# Patient Record
Sex: MALE | Race: WHITE | NOT HISPANIC OR LATINO | Employment: FULL TIME | URBAN - METROPOLITAN AREA
[De-identification: names, ages, dates, MRNs, and addresses within clinical notes are randomized per-mention and may not be internally consistent; named-entity substitution may affect disease eponyms.]

---

## 2018-09-19 ENCOUNTER — HOSPITAL ENCOUNTER (EMERGENCY)
Facility: HOSPITAL | Age: 53
Discharge: HOME | End: 2018-09-19
Attending: EMERGENCY MEDICINE | Admitting: EMERGENCY MEDICINE

## 2018-09-19 ENCOUNTER — APPOINTMENT (OUTPATIENT)
Dept: CARDIOLOGY | Facility: HOSPITAL | Age: 53
End: 2018-09-19
Attending: EMERGENCY MEDICINE

## 2018-09-19 VITALS
TEMPERATURE: 99.4 F | HEART RATE: 79 BPM | BODY MASS INDEX: 37.11 KG/M2 | RESPIRATION RATE: 18 BRPM | HEIGHT: 73 IN | WEIGHT: 280 LBS | DIASTOLIC BLOOD PRESSURE: 70 MMHG | OXYGEN SATURATION: 95 % | SYSTOLIC BLOOD PRESSURE: 119 MMHG

## 2018-09-19 DIAGNOSIS — M10.9 ACUTE GOUT OF RIGHT ANKLE, UNSPECIFIED CAUSE: Primary | ICD-10-CM

## 2018-09-19 LAB
BASOPHILS # BLD AUTO: 0.01 10*3/MM3 (ref 0–0.2)
BASOPHILS NFR BLD AUTO: 0.1 % (ref 0–1.5)
BH CV LOWER VASCULAR LEFT COMMON FEMORAL AUGMENT: NORMAL
BH CV LOWER VASCULAR LEFT COMMON FEMORAL COMPETENT: NORMAL
BH CV LOWER VASCULAR LEFT COMMON FEMORAL COMPRESS: NORMAL
BH CV LOWER VASCULAR LEFT COMMON FEMORAL PHASIC: NORMAL
BH CV LOWER VASCULAR LEFT COMMON FEMORAL SPONT: NORMAL
BH CV LOWER VASCULAR RIGHT COMMON FEMORAL AUGMENT: NORMAL
BH CV LOWER VASCULAR RIGHT COMMON FEMORAL COMPETENT: NORMAL
BH CV LOWER VASCULAR RIGHT COMMON FEMORAL COMPRESS: NORMAL
BH CV LOWER VASCULAR RIGHT COMMON FEMORAL PHASIC: NORMAL
BH CV LOWER VASCULAR RIGHT COMMON FEMORAL SPONT: NORMAL
BH CV LOWER VASCULAR RIGHT DISTAL FEMORAL COMPRESS: NORMAL
BH CV LOWER VASCULAR RIGHT GASTRONEMIUS COMPRESS: NORMAL
BH CV LOWER VASCULAR RIGHT GREATER SAPH AK COMPRESS: NORMAL
BH CV LOWER VASCULAR RIGHT GREATER SAPH BK COMPRESS: NORMAL
BH CV LOWER VASCULAR RIGHT LESSER SAPH COMPRESS: NORMAL
BH CV LOWER VASCULAR RIGHT MID FEMORAL AUGMENT: NORMAL
BH CV LOWER VASCULAR RIGHT MID FEMORAL COMPETENT: NORMAL
BH CV LOWER VASCULAR RIGHT MID FEMORAL COMPRESS: NORMAL
BH CV LOWER VASCULAR RIGHT MID FEMORAL PHASIC: NORMAL
BH CV LOWER VASCULAR RIGHT MID FEMORAL SPONT: NORMAL
BH CV LOWER VASCULAR RIGHT PERONEAL COMPRESS: NORMAL
BH CV LOWER VASCULAR RIGHT POPLITEAL AUGMENT: NORMAL
BH CV LOWER VASCULAR RIGHT POPLITEAL COMPETENT: NORMAL
BH CV LOWER VASCULAR RIGHT POPLITEAL COMPRESS: NORMAL
BH CV LOWER VASCULAR RIGHT POPLITEAL PHASIC: NORMAL
BH CV LOWER VASCULAR RIGHT POPLITEAL SPONT: NORMAL
BH CV LOWER VASCULAR RIGHT POSTERIOR TIBIAL COMPRESS: NORMAL
BH CV LOWER VASCULAR RIGHT PROXIMAL FEMORAL COMPRESS: NORMAL
BH CV LOWER VASCULAR RIGHT SAPHENOFEMORAL JUNCTION AUGMENT: NORMAL
BH CV LOWER VASCULAR RIGHT SAPHENOFEMORAL JUNCTION COMPETENT: NORMAL
BH CV LOWER VASCULAR RIGHT SAPHENOFEMORAL JUNCTION COMPRESS: NORMAL
BH CV LOWER VASCULAR RIGHT SAPHENOFEMORAL JUNCTION PHASIC: NORMAL
BH CV LOWER VASCULAR RIGHT SAPHENOFEMORAL JUNCTION SPONT: NORMAL
D DIMER PPP FEU-MCNC: 0.96 MCGFEU/ML (ref 0–0.49)
DEPRECATED RDW RBC AUTO: 46.3 FL (ref 37–54)
EOSINOPHIL # BLD AUTO: 0.06 10*3/MM3 (ref 0–0.7)
EOSINOPHIL NFR BLD AUTO: 0.9 % (ref 0.3–6.2)
ERYTHROCYTE [DISTWIDTH] IN BLOOD BY AUTOMATED COUNT: 13.5 % (ref 11.5–14.5)
HCT VFR BLD AUTO: 46.4 % (ref 40.4–52.2)
HGB BLD-MCNC: 15.3 G/DL (ref 13.7–17.6)
IMM GRANULOCYTES # BLD: 0 10*3/MM3 (ref 0–0.03)
IMM GRANULOCYTES NFR BLD: 0 % (ref 0–0.5)
INR PPP: 1.05 (ref 0.9–1.1)
LYMPHOCYTES # BLD AUTO: 1.82 10*3/MM3 (ref 0.9–4.8)
LYMPHOCYTES NFR BLD AUTO: 26.5 % (ref 19.6–45.3)
MCH RBC QN AUTO: 31.2 PG (ref 27–32.7)
MCHC RBC AUTO-ENTMCNC: 33 G/DL (ref 32.6–36.4)
MCV RBC AUTO: 94.5 FL (ref 79.8–96.2)
MONOCYTES # BLD AUTO: 1.06 10*3/MM3 (ref 0.2–1.2)
MONOCYTES NFR BLD AUTO: 15.5 % (ref 5–12)
NEUTROPHILS # BLD AUTO: 3.91 10*3/MM3 (ref 1.9–8.1)
NEUTROPHILS NFR BLD AUTO: 57 % (ref 42.7–76)
PLATELET # BLD AUTO: 210 10*3/MM3 (ref 140–500)
PMV BLD AUTO: 10 FL (ref 6–12)
PROTHROMBIN TIME: 13.5 SECONDS (ref 11.7–14.2)
RBC # BLD AUTO: 4.91 10*6/MM3 (ref 4.6–6)
URATE SERPL-MCNC: 8.3 MG/DL (ref 3.4–7)
WBC NRBC COR # BLD: 6.86 10*3/MM3 (ref 4.5–10.7)

## 2018-09-19 PROCEDURE — 93971 EXTREMITY STUDY: CPT

## 2018-09-19 PROCEDURE — 85025 COMPLETE CBC W/AUTO DIFF WBC: CPT | Performed by: EMERGENCY MEDICINE

## 2018-09-19 PROCEDURE — 85379 FIBRIN DEGRADATION QUANT: CPT | Performed by: EMERGENCY MEDICINE

## 2018-09-19 PROCEDURE — 85610 PROTHROMBIN TIME: CPT | Performed by: EMERGENCY MEDICINE

## 2018-09-19 PROCEDURE — 99283 EMERGENCY DEPT VISIT LOW MDM: CPT

## 2018-09-19 PROCEDURE — 84550 ASSAY OF BLOOD/URIC ACID: CPT | Performed by: EMERGENCY MEDICINE

## 2018-09-19 RX ORDER — PROPRANOLOL HYDROCHLORIDE 10 MG/1
10 TABLET ORAL 3 TIMES DAILY
COMMUNITY

## 2018-09-19 RX ORDER — PREDNISONE 10 MG/1
TABLET ORAL
Qty: 52 TABLET | Refills: 0 | Status: SHIPPED | OUTPATIENT
Start: 2018-09-19

## 2018-09-19 RX ORDER — COLCHICINE 0.6 MG/1
0.6 TABLET ORAL 3 TIMES DAILY
Qty: 3 TABLET | Refills: 0 | Status: SHIPPED | OUTPATIENT
Start: 2018-09-19 | End: 2018-09-20

## 2018-09-19 NOTE — ED PROVIDER NOTES
EMERGENCY DEPARTMENT ENCOUNTER    Room number:  12/12  Date Seen:  9/19/2018  Time of transfer: 0700  PCP:  Manasa To MD    Laboratory Results:  Recent Results (from the past 24 hour(s))   Protime-INR    Collection Time: 09/19/18  6:02 AM   Result Value Ref Range    Protime 13.5 11.7 - 14.2 Seconds    INR 1.05 0.90 - 1.10   D-dimer, Quantitative    Collection Time: 09/19/18  6:02 AM   Result Value Ref Range    D-Dimer, Quantitative 0.96 (H) 0.00 - 0.49 MCGFEU/mL   Uric Acid    Collection Time: 09/19/18  6:02 AM   Result Value Ref Range    Uric Acid 8.3 (H) 3.4 - 7.0 mg/dL   CBC Auto Differential    Collection Time: 09/19/18  6:02 AM   Result Value Ref Range    WBC 6.86 4.50 - 10.70 10*3/mm3    RBC 4.91 4.60 - 6.00 10*6/mm3    Hemoglobin 15.3 13.7 - 17.6 g/dL    Hematocrit 46.4 40.4 - 52.2 %    MCV 94.5 79.8 - 96.2 fL    MCH 31.2 27.0 - 32.7 pg    MCHC 33.0 32.6 - 36.4 g/dL    RDW 13.5 11.5 - 14.5 %    RDW-SD 46.3 37.0 - 54.0 fl    MPV 10.0 6.0 - 12.0 fL    Platelets 210 140 - 500 10*3/mm3    Neutrophil % 57.0 42.7 - 76.0 %    Lymphocyte % 26.5 19.6 - 45.3 %    Monocyte % 15.5 (H) 5.0 - 12.0 %    Eosinophil % 0.9 0.3 - 6.2 %    Basophil % 0.1 0.0 - 1.5 %    Immature Grans % 0.0 0.0 - 0.5 %    Neutrophils, Absolute 3.91 1.90 - 8.10 10*3/mm3    Lymphocytes, Absolute 1.82 0.90 - 4.80 10*3/mm3    Monocytes, Absolute 1.06 0.20 - 1.20 10*3/mm3    Eosinophils, Absolute 0.06 0.00 - 0.70 10*3/mm3    Basophils, Absolute 0.01 0.00 - 0.20 10*3/mm3    Immature Grans, Absolute 0.00 0.00 - 0.03 10*3/mm3   Duplex Venous Lower Extremity - Right    Collection Time: 09/19/18  8:22 AM   Result Value Ref Range    Right Common Femoral Spont Y     Right Common Femoral Phasic Y     Right Common Femoral Augment Y     Right Common Femoral Competent Y     Right Common Femoral Compress C     Right Saphenofemoral Junction Spont Y     Right Saphenofemoral Junction Phasic Y     Right Saphenofemoral Junction Augment Y     Right  Saphenofemoral Junction Competent Y     Right Saphenofemoral Junction Compress C     Right Proximal Femoral Compress C     Right Mid Femoral Spont Y     Right Mid Femoral Phasic Y     Right Mid Femoral Augment Y     Right Mid Femoral Competent Y     Right Mid Femoral Compress C     Right Distal Femoral Compress C     Right Popliteal Spont Y     Right Popliteal Phasic Y     Right Popliteal Augment Y     Right Popliteal Competent Y     Right Popliteal Compress C     Right Posterior Tibial Compress C     Right Peroneal Compress C     Right GastronemiusSoleal Compress C     Right Greater Saph AK Compress C     Right Greater Saph BK Compress C     Right Lesser Saph Compress C     Left Common Femoral Spont Y     Left Common Femoral Phasic Y     Left Common Femoral Augment Y     Left Common Femoral Competent Y     Left Common Femoral Compress C      I reviewed the above results.      Medications ordered in ED:  Medications - No data to display    Progress and Consult Notes:  0700    Patient care transferred from Dr. Loyd pending US results.    0834  Pt presents with right ankle pain that began several days ago while Pt was sleeping. Pt states that his pain is worse with flexion. He has taken advil for pain with no relief. He denies any trauma to the area.   Informed Pt that his US is negative and that his uric acid is elevated. This indicates that it is most likely gout.       GENERAL: not distressed  HENT: nares patent  EYES: no scleral icterus  CV: regular rhythm, regular rate, 2+ DP and PT pulses  RESPIRATORY: normal effort, bibasilar crackles with rhonchi on the left  ABDOMEN: soft, non-tender  MUSCULOSKELETAL: no deformity, pain with passive ROM of ankle on dorsiflexion but essentially no pain with plantarflexion and inversion/eversion , right ankle is warm.  NEURO: alert, CLEMENTE, FC  SKIN: warm, dry    Discussed plan to discharge Pt with steroids and colchicine to treat (patient would like both as he has used both  previously). Pt understands and agrees to all plans. All questions answered. I gave him good return precautions including return for any chest pain, SOA, fever, increased pain or leg swelling, or new rash.       Diagnosis:  Final diagnoses:   Acute gout of right ankle, unspecified cause       Follow Up:  Manasa To MD  [address] [place] [address]  [address]  [place] [address]  [phone]    Schedule an appointment as soon as possible for a visit   As needed      RX:     Medication List      New Prescriptions    colchicine 0.6 MG tablet  Take 1 tablet by mouth 3 (Three) Times a Day for 1 day.     predniSONE 10 MG tablet  Commonly known as:  DELTASONE  Take 4 tablets daily for 7 days. Then take 3 tablets daily for 2 days.   Take 2 tablets daily for 2 days. Take 1 tablet daily for 2 days.          Provider attestation:  I personally reviewed the past medical history, past surgical history, social history, family history, current medications, and allergies as they appear in the chart.    The patient was seen and examined by myself and Dr. Loyd, who agree with plan.          Vida Abreu  09/19/18 0949       Raji Valencia II, MD  09/20/18 0260

## 2018-09-19 NOTE — ED PROVIDER NOTES
EMERGENCY DEPARTMENT ENCOUNTER    CHIEF COMPLAINT  Chief Complaint: Ankle pain  History given by: patient   History limited by: n/a  Room Number: 12/12  PMD: Manasa To MD      HPI:  Pt is a 52 y.o. male who presents complaining of right ankle pain that began began suddenly 2 nights ago while sleeping. The pain is worsened by movement and touch. Pt denies a known injury, CP, or SOA. He has no other complaints at this time.     Duration:  2 days   Onset: sudden  Timing: constant   Location: right ankle  Radiation: none  Quality: pain  Intensity/Severity: moderate  Progression: worsening   Associated Symptoms: none  Aggravating Factors: movement   Alleviating Factors: none    PAST MEDICAL HISTORY  Active Ambulatory Problems     Diagnosis Date Noted   • No Active Ambulatory Problems     Resolved Ambulatory Problems     Diagnosis Date Noted   • No Resolved Ambulatory Problems     Past Medical History:   Diagnosis Date   • Anxiety    • Hypertension        PAST SURGICAL HISTORY  Past Surgical History:   Procedure Laterality Date   • CARDIAC CATHETERIZATION     • VARICOSE VEIN SURGERY         FAMILY HISTORY  History reviewed. No pertinent family history.    SOCIAL HISTORY  Social History     Social History   • Marital status:      Spouse name: N/A   • Number of children: N/A   • Years of education: N/A     Occupational History   • Not on file.     Social History Main Topics   • Smoking status: Never Smoker   • Smokeless tobacco: Never Used   • Alcohol use Yes      Comment: occas   • Drug use: No   • Sexual activity: Not on file     Other Topics Concern   • Not on file     Social History Narrative   • No narrative on file       ALLERGIES  Patient has no known allergies.    REVIEW OF SYSTEMS  Review of Systems   Constitutional: Negative for chills and fever.   HENT: Negative for sore throat.    Gastrointestinal: Negative for nausea and vomiting.   Genitourinary: Negative for dysuria.   Musculoskeletal:  Positive for arthralgias (right ankle). Negative for back pain.   Skin: Negative for rash.   Psychiatric/Behavioral: The patient is not nervous/anxious.        PHYSICAL EXAM  ED Triage Vitals [09/19/18 0520]   Temp Heart Rate Resp BP SpO2   99.4 °F (37.4 °C) 98 16 -- 95 %      Temp src Heart Rate Source Patient Position BP Location FiO2 (%)   -- Monitor -- -- --       Physical Exam   Constitutional: No distress.   HENT:   Head: Normocephalic and atraumatic.   Eyes: EOM are normal.   Neck: Normal range of motion.   Cardiovascular: Normal rate, regular rhythm and normal heart sounds.    Pulmonary/Chest: Effort normal and breath sounds normal. No respiratory distress.   Abdominal: Soft. There is no tenderness.   Musculoskeletal: He exhibits no edema.   Tenderness behind the left lateral malleolus and across the dorsum of the left foot. Increased pain with all ROM against resistance. No swelling present. Mild left calf tenderness.    Neurological: He is alert.   Skin: Skin is warm and dry.   Nursing note and vitals reviewed.      LAB RESULTS  Lab Results (last 24 hours)     Procedure Component Value Units Date/Time    CBC & Differential [[id number]] Collected:  09/19/18 0602    Specimen:  Blood Updated:  09/19/18 0635    Narrative:       The following orders were created for panel order CBC & Differential.  Procedure                               Abnormality         Status                     ---------                               -----------         ------                     CBC Auto Differential[[id number]]         Abnormal            Final result                 Please view results for these tests on the individual orders.    Protime-INR [[id number]]  (Normal) Collected:  09/19/18 0602    Specimen:  Blood Updated:  09/19/18 0631     Protime 13.5 Seconds      INR 1.05    D-dimer, Quantitative [[id number]]  (Abnormal) Collected:  09/19/18 0602    Specimen:  Blood Updated:  09/19/18 0631     D-Dimer, Quantitative 0.96 (H)  MCGFEU/mL     Narrative:       The [employer]ago D-Dimer test used in conjunction with a clinical pretest probability (PTP) assessment model, has been approved by the FDA to rule out the presence of venous thromboembolism (VTE) in outpatients suspected of deep venous thrombosis (DVT) or pulmonary embolism (PE).     Uric Acid [[id number]]  (Abnormal) Collected:  09/19/18 0602    Specimen:  Blood Updated:  09/19/18 0637     Uric Acid 8.3 (H) mg/dL     CBC Auto Differential [[id number]]  (Abnormal) Collected:  09/19/18 0602    Specimen:  Blood Updated:  09/19/18 0635     WBC 6.86 10*3/mm3      RBC 4.91 10*6/mm3      Hemoglobin 15.3 g/dL      Hematocrit 46.4 %      MCV 94.5 fL      MCH 31.2 pg      MCHC 33.0 g/dL      RDW 13.5 %      RDW-SD 46.3 fl      MPV 10.0 fL      Platelets 210 10*3/mm3      Neutrophil % 57.0 %      Lymphocyte % 26.5 %      Monocyte % 15.5 (H) %      Eosinophil % 0.9 %      Basophil % 0.1 %      Immature Grans % 0.0 %      Neutrophils, Absolute 3.91 10*3/mm3      Lymphocytes, Absolute 1.82 10*3/mm3      Monocytes, Absolute 1.06 10*3/mm3      Eosinophils, Absolute 0.06 10*3/mm3      Basophils, Absolute 0.01 10*3/mm3      Immature Grans, Absolute 0.00 10*3/mm3           I ordered the above labs and reviewed the results      PROCEDURES  Procedures       PROGRESS AND CONSULTS       05:40  BP- 152/93 HR- 98 Temp- 99.4 °F (37.4 °C) O2 sat- 95%  Informed pt of the plan for labs to r/o gout and DVT. As there is no injury, XR is not indicated. Pt understands and agrees with the plan, all questions answered.    05:42  PT/INR, d-dimer, uric acid, and CBC ordered.     06:37  D-dimer is elevated. Duplex doppler of the RLE ordered.     06:38  BP- 152/93 HR- 98 Temp- 99.4 °F (37.4 °C) O2 sat- 95%  Rechecked the patient who is in NAD and is resting comfortably. Informed pt that the d-dimer is slightly elevated, so plan for doppler to r/o DVT. Uric acid is also elevated, so sx may represent gout if doppler is negative. Pt  understands and agrees with the plan, all questions answered.    07:00  Cared turned over to Dr Valencia pending duplex venous doppler results and final disposition.     MEDICAL DECISION MAKING  Results were reviewed/discussed with the patient and they were also made aware of online access. Pt also made aware that some labs, such as cultures, will not be resulted during ER visit and follow up with PMD is necessary.     MDM  Number of Diagnoses or Management Options     Amount and/or Complexity of Data Reviewed  Clinical lab tests: ordered and reviewed           DIAGNOSIS  Final diagnoses:   None       DISPOSITION  Care turned over     Latest Documented Vital Signs:  As of 6:40 AM  BP- 152/93 HR- 98 Temp- 99.4 °F (37.4 °C) O2 sat- 95%    --  Documentation assistance provided by ervin Arboleda for Dr Loyd.  Information recorded by the [facility] was done at my direction and has been verified and validated by me.       Iraida Arboleda  09/19/18 4200       Flo Loyd MD  09/19/18 6232

## 2018-09-19 NOTE — ED NOTES
Pt to ED for c/o of right ankle pain that started Monday night. Pt states the pain shoots up his right leg and has progressively worsened. Pt denies any recent falls/injuries.        Rosana Joseph RN  09/19/18 1985

## 2021-03-30 ENCOUNTER — BULK ORDERING (OUTPATIENT)
Dept: CASE MANAGEMENT | Facility: OTHER | Age: 56
End: 2021-03-30

## 2021-03-30 DIAGNOSIS — Z23 IMMUNIZATION DUE: ICD-10-CM
